# Patient Record
Sex: FEMALE | Race: WHITE | NOT HISPANIC OR LATINO | URBAN - NONMETROPOLITAN AREA
[De-identification: names, ages, dates, MRNs, and addresses within clinical notes are randomized per-mention and may not be internally consistent; named-entity substitution may affect disease eponyms.]

---

## 2019-02-18 ENCOUNTER — IMPORTED ENCOUNTER (OUTPATIENT)
Dept: URBAN - NONMETROPOLITAN AREA CLINIC 1 | Facility: CLINIC | Age: 69
End: 2019-02-18

## 2019-02-18 PROBLEM — H26.493: Noted: 2019-02-18

## 2019-02-18 PROBLEM — Z96.1: Noted: 2019-02-18

## 2019-02-18 PROBLEM — H04.123: Noted: 2019-02-18

## 2019-02-18 PROBLEM — H52.222: Noted: 2019-02-18

## 2019-02-18 PROBLEM — H52.03: Noted: 2019-02-18

## 2019-02-18 PROBLEM — H52.4: Noted: 2019-02-18

## 2019-02-18 PROCEDURE — 92004 COMPRE OPH EXAM NEW PT 1/>: CPT

## 2019-02-18 PROCEDURE — 92015 DETERMINE REFRACTIVE STATE: CPT

## 2019-02-18 NOTE — PATIENT DISCUSSION
Pseudophakia w/ PCO OU-  discussed findings w/patient-  both intraocular lenses are stable and in position-  PCO noted OU not yet surgical-  monitor yearly or prnDry Eye Syndrome-  discussed findings w/patient-  continue Artificial tears PRN OU-  monitor yearly or prn Simple Hyperopia OD/Compound Hyperopic Astigmatism OS w/Presbyopia-  discussed findings w/patient-  new spectacle Rx issued for PRN use-  monitor yearly or prn; 's Notes: PT has had lasik sx JS 2000Toric OD  LRI OS  rec for best distance VAPt strongly desires the TMF      possibly give a minimal RX for distance with a glare coating

## 2020-03-13 ENCOUNTER — IMPORTED ENCOUNTER (OUTPATIENT)
Dept: URBAN - NONMETROPOLITAN AREA CLINIC 1 | Facility: CLINIC | Age: 70
End: 2020-03-13

## 2020-03-13 PROCEDURE — 92014 COMPRE OPH EXAM EST PT 1/>: CPT

## 2020-03-13 NOTE — PATIENT DISCUSSION
Pseudophakia w/ PCO OU-  discussed findings w/patient-  both intraocular lenses are stable and in position-  no changes noted today-  mild PCO noted at this time-  monitor yearly or prnDry Eye Syndrome-  discussed findings w/patient-  continue Artificial tears PRN OU-  monitor yearly or prn; 's Notes: PT has had lasik sx JS 2000<br />MR defer<br />DFE 3/13/2020<br />

## 2021-09-14 ENCOUNTER — IMPORTED ENCOUNTER (OUTPATIENT)
Dept: URBAN - NONMETROPOLITAN AREA CLINIC 1 | Facility: CLINIC | Age: 71
End: 2021-09-14

## 2021-09-14 PROBLEM — H04.123: Noted: 2021-09-14

## 2021-09-14 PROBLEM — Z96.1: Noted: 2021-09-14

## 2021-09-14 PROBLEM — H01.01A: Noted: 2021-09-14

## 2021-09-14 PROBLEM — H01.01B: Noted: 2021-09-14

## 2021-09-14 PROBLEM — H26.493: Noted: 2021-09-14

## 2021-09-14 PROCEDURE — 99213 OFFICE O/P EST LOW 20 MIN: CPT

## 2021-09-14 NOTE — PATIENT DISCUSSION
Blepharitis OU-  discussed findings w/patient-  start Avenova QD OU-  start Cool Compresses at least BID OU-  continue to monitor as scheduled or prn; 's Notes: PT has had lasik sx JS 2000MR deferDFE 3/13/2020

## 2022-04-10 ASSESSMENT — VISUAL ACUITY
OS_SC: J1
OD_CC: 20/25
OS_CC: 20/30+2
OD_CC: 20/20
OS_CC: 20/25
OD_SC: J1
OU_SC: 20/20
OU_CC: 20/20
OU_CC: J1
OD_CC: 20/30+2
OS_CC: 20/25

## 2022-04-10 ASSESSMENT — TONOMETRY
OD_IOP_MMHG: 16
OS_IOP_MMHG: 17
OD_IOP_MMHG: 17
OD_IOP_MMHG: 16
OS_IOP_MMHG: 16
OS_IOP_MMHG: 17

## 2023-05-25 ENCOUNTER — EMERGENCY VISIT (OUTPATIENT)
Dept: RURAL CLINIC 1 | Facility: CLINIC | Age: 73
End: 2023-05-25

## 2023-05-25 DIAGNOSIS — H01.01A: ICD-10-CM

## 2023-05-25 DIAGNOSIS — H16.223: ICD-10-CM

## 2023-05-25 DIAGNOSIS — H43.813: ICD-10-CM

## 2023-05-25 DIAGNOSIS — H40.013: ICD-10-CM

## 2023-05-25 DIAGNOSIS — H01.01B: ICD-10-CM

## 2023-05-25 DIAGNOSIS — H26.493: ICD-10-CM

## 2023-05-25 PROCEDURE — 92134 CPTRZ OPH DX IMG PST SGM RTA: CPT

## 2023-05-25 PROCEDURE — 92014 COMPRE OPH EXAM EST PT 1/>: CPT

## 2023-05-25 ASSESSMENT — VISUAL ACUITY
OU_SC: 20/25
OS_SC: 20/25
OD_SC: 20/25

## 2023-05-25 ASSESSMENT — TONOMETRY
OD_IOP_MMHG: 20
OS_IOP_MMHG: 20

## 2023-10-05 ENCOUNTER — FOLLOW UP (OUTPATIENT)
Dept: RURAL CLINIC 1 | Facility: CLINIC | Age: 73
End: 2023-10-05

## 2023-10-05 DIAGNOSIS — H26.493: ICD-10-CM

## 2023-10-05 DIAGNOSIS — H40.013: ICD-10-CM

## 2023-10-05 PROCEDURE — 99213 OFFICE O/P EST LOW 20 MIN: CPT

## 2023-10-05 PROCEDURE — 92083 EXTENDED VISUAL FIELD XM: CPT

## 2023-10-05 ASSESSMENT — VISUAL ACUITY
OU_SC: 20/40+2
OS_PH: 20/30-2
OS_SC: 20/40
OD_SC: 20/30-1

## 2023-10-05 ASSESSMENT — TONOMETRY
OD_IOP_MMHG: 18
OS_IOP_MMHG: 18
OD_IOP_MMHG: 08
OS_IOP_MMHG: 08